# Patient Record
Sex: MALE | Race: WHITE | NOT HISPANIC OR LATINO | Employment: STUDENT | URBAN - METROPOLITAN AREA
[De-identification: names, ages, dates, MRNs, and addresses within clinical notes are randomized per-mention and may not be internally consistent; named-entity substitution may affect disease eponyms.]

---
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
## 2020-03-18 ENCOUNTER — OFFICE VISIT (OUTPATIENT)
Dept: FAMILY MEDICINE CLINIC | Facility: CLINIC | Age: 20
End: 2020-03-18
Payer: COMMERCIAL

## 2020-03-18 VITALS
TEMPERATURE: 98.5 F | BODY MASS INDEX: 29.03 KG/M2 | HEIGHT: 73 IN | WEIGHT: 219 LBS | HEART RATE: 72 BPM | RESPIRATION RATE: 14 BRPM | SYSTOLIC BLOOD PRESSURE: 120 MMHG | DIASTOLIC BLOOD PRESSURE: 80 MMHG

## 2020-03-18 DIAGNOSIS — J01.10 ACUTE NON-RECURRENT FRONTAL SINUSITIS: Primary | ICD-10-CM

## 2020-03-18 PROCEDURE — 1036F TOBACCO NON-USER: CPT | Performed by: FAMILY MEDICINE

## 2020-03-18 PROCEDURE — 99203 OFFICE O/P NEW LOW 30 MIN: CPT | Performed by: FAMILY MEDICINE

## 2020-03-18 PROCEDURE — 3008F BODY MASS INDEX DOCD: CPT | Performed by: FAMILY MEDICINE

## 2020-03-18 RX ORDER — AMOXICILLIN AND CLAVULANATE POTASSIUM 875; 125 MG/1; MG/1
1 TABLET, FILM COATED ORAL EVERY 12 HOURS SCHEDULED
Qty: 14 TABLET | Refills: 0 | Status: SHIPPED | OUTPATIENT
Start: 2020-03-18 | End: 2020-03-25

## 2020-03-18 NOTE — PROGRESS NOTES
Assessment/Plan:    1  Acute non-recurrent frontal sinusitis  -     amoxicillin-clavulanate (Augmentin) 875-125 mg per tablet; Take 1 tablet by mouth every 12 (twelve) hours for 7 days    Supportive care with fluids, rest, and flonase  Patient has tried flonase and will give augmentin to take if worsening symptoms  Patient precautions reviewed including rash, hive, and SOB  Patient should be evaluated  Patient agrees with plan  If symptoms progress, patient should follow up  Return if symptoms worsen or fail to improve, for Annual physical     Subjective:      Patient ID: Jose Flowers is a 23 y o  male  Chief Complaint   Patient presents with    Cold Like Symptoms     pt c/o post nasal drip, congestion and headache x2-3 days       HPI     22 y/o male presents new patient  Patient complains of cold like symptoms for the past 3-4 days  Has stuffy nose and chest congestion  Cough but not all the time  Headaches that are not resolving  Felt warm but no temperature  No chills  Has tried dyllsum, flonase, and nyquil with no relief  No sick contacts  No recent travel  No flu vaccine given  The following portions of the patient's history were reviewed and updated as appropriate: allergies, current medications, past family history, past medical history, past social history, past surgical history and problem list     Review of Systems   Constitutional: Positive for fatigue  Negative for chills and fever  HENT: Positive for congestion, postnasal drip, sinus pressure and sore throat (scratchy)  Respiratory: Positive for cough  Negative for shortness of breath  Cardiovascular: Negative for chest pain and leg swelling  Gastrointestinal: Negative for abdominal pain, constipation, diarrhea, nausea and vomiting  Genitourinary: Negative for dysuria  Neurological: Positive for light-headedness and headaches  Negative for dizziness and weakness  Psychiatric/Behavioral: Negative for agitation  Current Outpatient Medications   Medication Sig Dispense Refill    amoxicillin-clavulanate (Augmentin) 875-125 mg per tablet Take 1 tablet by mouth every 12 (twelve) hours for 7 days 14 tablet 0     No current facility-administered medications for this visit  Objective:    /80 (BP Location: Left arm, Patient Position: Sitting, Cuff Size: Adult)   Pulse 72   Temp 98 5 °F (36 9 °C) (Tympanic)   Resp 14   Ht 6' 1" (1 854 m)   Wt 99 3 kg (219 lb)   BMI 28 89 kg/m²        Physical Exam   Constitutional: He is oriented to person, place, and time  He appears well-developed and well-nourished  HENT:   Head: Normocephalic and atraumatic  Right Ear: External ear normal    Left Ear: External ear normal    Nose: Nose normal    Mouth/Throat: Oropharynx is clear and moist  No oropharyngeal exudate  Eyes: Conjunctivae and EOM are normal  Right eye exhibits no discharge  Left eye exhibits no discharge  Neck: Normal range of motion  Neck supple  Cardiovascular: Normal rate, regular rhythm, normal heart sounds and intact distal pulses  Pulmonary/Chest: Effort normal and breath sounds normal  He has no wheezes  Abdominal: Soft  Bowel sounds are normal  There is no tenderness  Musculoskeletal: He exhibits no edema or tenderness  Lymphadenopathy:     He has no cervical adenopathy  Neurological: He is alert and oriented to person, place, and time  Skin: Skin is warm  No erythema  Psychiatric: He has a normal mood and affect  His behavior is normal    Vitals reviewed               Tone Aldana MD

## 2020-09-18 ENCOUNTER — TELEPHONE (OUTPATIENT)
Dept: OBGYN CLINIC | Facility: HOSPITAL | Age: 20
End: 2020-09-18

## 2020-09-18 NOTE — TELEPHONE ENCOUNTER
Duane Vega [phone]    Mother called stating Dr Eduardo Bradford needed to talk with patient no answer at office, please return call

## 2022-07-21 ENCOUNTER — OFFICE VISIT (OUTPATIENT)
Dept: FAMILY MEDICINE CLINIC | Facility: CLINIC | Age: 22
End: 2022-07-21
Payer: COMMERCIAL

## 2022-07-21 VITALS
SYSTOLIC BLOOD PRESSURE: 110 MMHG | WEIGHT: 218 LBS | BODY MASS INDEX: 27.98 KG/M2 | HEART RATE: 84 BPM | RESPIRATION RATE: 16 BRPM | DIASTOLIC BLOOD PRESSURE: 70 MMHG | TEMPERATURE: 97.9 F | HEIGHT: 74 IN

## 2022-07-21 DIAGNOSIS — F41.9 ANXIETY: ICD-10-CM

## 2022-07-21 DIAGNOSIS — F43.20 ADULT SITUATIONAL STRESS DISORDER: ICD-10-CM

## 2022-07-21 DIAGNOSIS — F41.0 PANIC ATTACKS: Primary | ICD-10-CM

## 2022-07-21 PROCEDURE — 3725F SCREEN DEPRESSION PERFORMED: CPT | Performed by: STUDENT IN AN ORGANIZED HEALTH CARE EDUCATION/TRAINING PROGRAM

## 2022-07-21 PROCEDURE — 99213 OFFICE O/P EST LOW 20 MIN: CPT | Performed by: STUDENT IN AN ORGANIZED HEALTH CARE EDUCATION/TRAINING PROGRAM

## 2022-07-21 RX ORDER — ALPRAZOLAM 0.25 MG/1
0.25 TABLET ORAL
Qty: 10 TABLET | Refills: 0 | Status: SHIPPED | OUTPATIENT
Start: 2022-07-21 | End: 2022-08-25 | Stop reason: SDUPTHER

## 2022-07-21 NOTE — PROGRESS NOTES
Clinic Visit Note  Eric Munguia 25 y o  male   MRN: [id number]    Assessment and Plan      Diagnoses and all orders for this visit:    Panic attacks, Anxiety  Adult situation stress disorder  Patient having true panic attack symptoms affecting quality of life  We discussed using very low-dose alprazolam to help in symptomatic panic attacks is appropriate  Recommend even splitting 0 25 mg tablets  Will have close follow-up in the next 2 weeks to discuss how frequent panic attacks are happening  Hold off on long-term therapy at this time  -     ALPRAZolam (XANAX) 0 25 mg tablet; Take 1 tablet (0 25 mg total) by mouth daily at bedtime as needed for anxiety      My impressions and treatment recommendations were discussed in detail with the patient who verbalized understanding and had no further questions  Discharge instructions were provided  Subjective     Chief Complaint:  Follow-up visit    History of Present Illness:    Patient is a pleasant 20-year-old male coming in for panic attack symptoms  Patient notes that he is getting these more frequently with rapid heart rate, impending doom, cannot get a breath in  This is significantly affecting his school work, potential job opportunities  We discussed the risks versus benefits of medication, patient does not want to be on anything long-term  The following portions of the patient's history were reviewed and updated as appropriate: allergies, current medications, past family history, past medical history, past social history, past surgical history and problem list     REVIEW OF SYSTEMS:  A complete 12-point review of systems is negative other than that noted in the HPI  Review of Systems   Constitutional: Negative for chills and fever  HENT: Negative for ear pain and sore throat  Eyes: Negative for pain and visual disturbance  Respiratory: Negative for cough and shortness of breath      Cardiovascular: Negative for chest pain and palpitations  Gastrointestinal: Negative for abdominal pain and vomiting  Genitourinary: Negative for dysuria and hematuria  Musculoskeletal: Negative for arthralgias and back pain  Skin: Negative for color change and rash  Neurological: Negative for seizures and syncope  Psychiatric/Behavioral: Negative for agitation, behavioral problems and confusion  All other systems reviewed and are negative          Current Outpatient Medications:     ALPRAZolam (XANAX) 0 25 mg tablet, Take 1 tablet (0 25 mg total) by mouth daily at bedtime as needed for anxiety, Disp: 10 tablet, Rfl: 0  Past Medical History:   Diagnosis Date    Anxiety      Past Surgical History:   Procedure Laterality Date    APPENDECTOMY      Dec  2016, surgery done at [address] Right 10/8/2017    Procedure: [address], RLE;  Surgeon: Madelin Hendrix MD;  [facility];  Service: General    FASCIOTOMY Right 10/6/2017    Procedure: Right lower extremity fasciotomy and wound vac application;  Surgeon: Madelin Hendrix MD;  [facility];  Service: General    [address] [place] [address] Road Right 31/4/5665    Procedure: APPLICATION VAC DRESSING, lower extremity;  Surgeon: Madelin Hendrix MD;  [facility];  Service: General    WOUND DEBRIDEMENT Right 10/8/2017    Procedure: DEBRIDEMENT WOUND ([address]), right lower extremity;  Surgeon: Madelin Hendrix MD;  [facility];  Service: General     Social History     Socioeconomic History    Marital status: Single     Spouse name: Not on file    Number of children: Not on file    Years of education: Not on file    Highest education level: Not on file   Occupational History    Not on file   Tobacco Use    Smoking status: Never Smoker    Smokeless tobacco: Never Used   Substance and Sexual Activity    Alcohol use: No    Drug use: Yes     Types: Marijuana    Sexual activity: Not on file   Other Topics Concern    Not on file   Social History Narrative    Not on file     Social Determinants of Health     Financial Resource Strain: Not on file   Food Insecurity: Not on file   Transportation Needs: Not on file   Physical Activity: Not on file   Stress: Not on file   Social Connections: Not on file   Intimate Partner Violence: Not on file   Housing Stability: Not on file     Family History   Problem Relation Age of Onset    No Known Problems Mother     No Known Problems Father      Allergies   Allergen Reactions    Other        Objective     Vitals:    07/21/22 0843   BP: 110/70   BP Location: Left arm   Patient Position: Sitting   Cuff Size: Adult   Pulse: 84   Resp: 16   Temp: 97 9 °F (36 6 °C)   TempSrc: Temporal   Weight: 98 9 kg (218 lb)   Height: 6' 1 5" (1 867 m)       Physical Exam:     GENERAL: NAD, pleasant   HEENT:  NC/AT, PERRL, EOMI, no scleral icterus  CARDIAC:  RRR, +S1/S2, no S3/S4 appreciated, no m/g/r  PULMONARY:  CTA B/L, no wheezing/rales/rhonci, non-labored breathing  ABDOMEN:  Soft, NT/ND, no rebound/guarding/rigidity  Extremities:  No edema, cyanosis, or clubbing  Musculoskeletal:  Full range of motion intact in all extremities   NEUROLOGIC: Grossly intact, no focal deficits  SKIN:  No rashes or erythema noted on exposed skin  Psych: Normal affect, mood stable    ==  PLEASE NOTE:  This encounter was completed utilizing the [employer]/LS9 Direct Speech Voice Recognition Software  Grammatical errors, random word insertions, pronoun errors and incomplete sentences are occasional consequences of the system due to software limitations, ambient noise and hardware issues  These may be missed by proof reading prior to affixing electronic signature  Any questions or concerns about the content, text or information contained within the body of this dictation should be directly addressed to the physician for clarification  Please do not hesitate to call me directly if you have any any questions or concerns      Shira Dial Vollaro, Leoma Posrclas [facility] Internal Medicine   [facility]

## 2022-08-04 ENCOUNTER — TELEMEDICINE (OUTPATIENT)
Dept: FAMILY MEDICINE CLINIC | Facility: CLINIC | Age: 22
End: 2022-08-04
Payer: COMMERCIAL

## 2022-08-04 DIAGNOSIS — F41.9 ANXIETY: Primary | ICD-10-CM

## 2022-08-04 DIAGNOSIS — F41.0 PANIC ATTACKS: ICD-10-CM

## 2022-08-04 PROCEDURE — 99213 OFFICE O/P EST LOW 20 MIN: CPT | Performed by: STUDENT IN AN ORGANIZED HEALTH CARE EDUCATION/TRAINING PROGRAM

## 2022-08-04 RX ORDER — ESCITALOPRAM OXALATE 5 MG/1
5 TABLET ORAL DAILY
Qty: 30 TABLET | Refills: 5 | Status: SHIPPED | OUTPATIENT
Start: 2022-08-04 | End: 2022-08-25

## 2022-08-04 NOTE — PROGRESS NOTES
Virtual Regular Visit    Verification of patient location:    Patient is located in the following state in which I hold an active license [place]      Assessment/Plan:    Problem List Items Addressed This Visit    None     Visit Diagnoses     Anxiety    -  Primary    Relevant Medications    escitalopram (LEXAPRO) 5 mg tablet    Panic attacks            Panic attacks have been better controlled with Xanax q h s  p r n  Has needed use Xanax 4-5 times since last visit  Recommend starting low-dose Lexapro to curb anxiety  Risk vs benefits discussed, patient in agreement with starting medication  Follow-up 2-3 weeks virtual to discuss anti anxiety medications    Reason for visit is   Chief Complaint   Patient presents with    Virtual Regular Visit        Encounter provider Marylou Marcos DO    Provider located at [address] [place] [phone]      Recent Visits  No visits were found meeting these conditions  Showing recent visits within past 7 days and meeting all other requirements  Today's Visits  Date Type Provider Dept   08/04/22 1501 [id number] today's visits and meeting all other requirements  Future Appointments  No visits were found meeting these conditions  Showing future appointments within next 150 days and meeting all other requirements       The patient was identified by name and date of birth  Julia Candi was informed that this is a telemedicine visit and that the visit is being conducted through  [employer] and patient was informed this is a secure, HIPAA-complaint platform  He agrees to proceed     My office door was closed  No one else was in the room  He acknowledged consent and understanding of privacy and security of the video platform  The patient has agreed to participate and understands they can discontinue the visit at any time      Patient is aware this is a billable service  Subjective  Norm Andrew is a 25 y o  male follow-up on anxiety, panic attacks  Patient notes that panic attacks are still frequent but better controlled with as needed Xanax  Patient notes today no palpitations, chest pain, shortness of breath  Discussion on starting low-dose antianxiety medication  Past Medical History:   Diagnosis Date    Anxiety        Past Surgical History:   Procedure Laterality Date    APPENDECTOMY      Dec  2016, surgery done at [address] Right 10/8/2017    Procedure: CLOSURE WOUND, RLE;  Surgeon: Yolanda Serna MD;  [facility];  Service: General    FASCIOTOMY Right 10/6/2017    Procedure: Right lower extremity fasciotomy and wound vac application;  Surgeon: Yolanda Serna MD;  [facility];  Service: Nany Monae DRESSING APPLICATION Right 42/1/3532    Procedure: APPLICATION VAC DRESSING, lower extremity;  Surgeon: Yolanda Serna MD;  [facility];  Service: General    WOUND DEBRIDEMENT Right 10/8/2017    Procedure: DEBRIDEMENT WOUND ([address]), right lower extremity;  Surgeon: Yolanda Serna MD;  [facility];  Service: General       Current Outpatient Medications   Medication Sig Dispense Refill    escitalopram (LEXAPRO) 5 mg tablet Take 1 tablet (5 mg total) by mouth daily 30 tablet 5    ALPRAZolam (XANAX) 0 25 mg tablet Take 1 tablet (0 25 mg total) by mouth daily at bedtime as needed for anxiety 10 tablet 0     No current facility-administered medications for this visit  Allergies   Allergen Reactions    Other        Review of Systems   Constitutional: Negative for chills and fever  HENT: Negative for ear pain and sore throat  Eyes: Negative for pain and visual disturbance  Respiratory: Negative for cough and shortness of breath  Cardiovascular: Negative for chest pain and palpitations     Gastrointestinal: Negative for abdominal pain and vomiting  Genitourinary: Negative for dysuria and hematuria  Musculoskeletal: Negative for arthralgias and back pain  Skin: Negative for color change and rash  Neurological: Negative for seizures and syncope  Psychiatric/Behavioral: Negative for agitation, behavioral problems, confusion, self-injury, sleep disturbance and suicidal ideas  The patient is nervous/anxious  All other systems reviewed and are negative  Video Exam    Physical Exam  Constitutional:       General: He is not in acute distress  Eyes:      General: No scleral icterus  Pulmonary:      Effort: Pulmonary effort is normal  No respiratory distress  Musculoskeletal:         General: No swelling or deformity  Skin:     Coloration: Skin is not jaundiced  Findings: No bruising  Neurological:      General: No focal deficit present  Mental Status: He is alert and oriented to person, place, and time  Mental status is at baseline  Psychiatric:         Mood and Affect: Mood normal          Behavior: Behavior normal           I spent 16 minutes with patient today in which greater than 50% of the time was spent in counseling/coordination of care regarding Anxiety management plan    VIRTUAL VISIT [address] verbally agrees to participate in [employer]  Pt is aware that [employer] could be limited without vital signs or the ability to perform a full hands-on physical exam  Cooper Beatrice Cranker understands he or the provider may request at any time to terminate the video visit and request the patient to seek care or treatment in person

## 2022-08-25 ENCOUNTER — TELEMEDICINE (OUTPATIENT)
Dept: FAMILY MEDICINE CLINIC | Facility: CLINIC | Age: 22
End: 2022-08-25
Payer: COMMERCIAL

## 2022-08-25 DIAGNOSIS — F41.0 PANIC ATTACKS: ICD-10-CM

## 2022-08-25 DIAGNOSIS — F41.9 ANXIETY: Primary | ICD-10-CM

## 2022-08-25 PROCEDURE — 99213 OFFICE O/P EST LOW 20 MIN: CPT | Performed by: STUDENT IN AN ORGANIZED HEALTH CARE EDUCATION/TRAINING PROGRAM

## 2022-08-25 RX ORDER — ALPRAZOLAM 0.25 MG/1
0.25 TABLET ORAL
Qty: 10 TABLET | Refills: 0 | Status: SHIPPED | OUTPATIENT
Start: 2022-08-25 | End: 2022-08-30 | Stop reason: SDUPTHER

## 2022-08-25 NOTE — PROGRESS NOTES
Virtual Regular Visit    Verification of patient location:    Patient is located in the following state in which I hold an active license [place]      Assessment/Plan:    Problem List Items Addressed This Visit    None     Visit Diagnoses     Anxiety    -  Primary    Panic attacks        Relevant Medications    ALPRAZolam (XANAX) 0 25 mg tablet        Refill Xanax, symptoms appear to be doing better as stressors in life are improving  Continue to follow closely, recommend follow-up in 1 month  Please reach out if symptoms worsen  Reason for visit is   Chief Complaint   Patient presents with    Virtual Regular Visit        Encounter provider Aleah Smiley DO    Provider located at [address] [place] [phone]      Recent Visits  No visits were found meeting these conditions  Showing recent visits within past 7 days and meeting all other requirements  Today's Visits  Date Type Provider Dept   08/25/22 [address] today's visits and meeting all other requirements  Future Appointments  No visits were found meeting these conditions  Showing future appointments within next 150 days and meeting all other requirements       The patient was identified by name and date of birth  Ezekiel Morales was informed that this is a telemedicine visit and that the visit is being conducted through [facility] and patient was informed this is a secure, HIPAA-complaint platform  He agrees to proceed     My office door was closed  No one else was in the room  He acknowledged consent and understanding of privacy and security of the video platform  The patient has agreed to participate and understands they can discontinue the visit at any time  Patient is aware this is a billable service  Subjective  Ezekiel Morales is a 25 y o  male follow-up anxiety, panic attacks    We discussed last time starting low-dose Lexapro, after talk with family and looking more into the side effects patient would like to hold off on this which I feel is reasonable  Now that he is finishing college and job search is going better his anxiety and panic attacks are improving  Has not been using Xanax as much  Past Medical History:   Diagnosis Date    Anxiety        Past Surgical History:   Procedure Laterality Date    APPENDECTOMY      Dec  2016, surgery done at [address] Right 10/8/2017    Procedure: [address], RLE;  Surgeon: Samantha Peres MD;  [facility];  Service: General    FASCIOTOMY Right 10/6/2017    Procedure: Right lower extremity fasciotomy and wound vac application;  Surgeon: Samantha Peres MD;  [facility];  Service: Rhina Fought DRESSING APPLICATION Right 02/4/9003    Procedure: APPLICATION VAC DRESSING, lower extremity;  Surgeon: Samantha Peres MD;  [facility];  Service: General    WOUND DEBRIDEMENT Right 10/8/2017    Procedure: DEBRIDEMENT WOUND ([address]), right lower extremity;  Surgeon: Samantha Peres MD;  [facility];  Service: General       Current Outpatient Medications   Medication Sig Dispense Refill    ALPRAZolam (XANAX) 0 25 mg tablet Take 1 tablet (0 25 mg total) by mouth daily at bedtime as needed for anxiety 10 tablet 0     No current facility-administered medications for this visit  Allergies   Allergen Reactions    Other        Review of Systems   Constitutional: Negative for chills and fever  HENT: Negative for ear pain and sore throat  Eyes: Negative for pain and visual disturbance  Respiratory: Negative for cough and shortness of breath  Cardiovascular: Negative for chest pain and palpitations  Gastrointestinal: Negative for abdominal pain and vomiting  Genitourinary: Negative for dysuria and hematuria  Musculoskeletal: Negative for arthralgias and back pain  Skin: Negative for color change and rash  Neurological: Negative for seizures and syncope  All other systems reviewed and are negative  Video Exam    There were no vitals filed for this visit  Physical Exam  Constitutional:       General: He is not in acute distress  Appearance: He is normal weight  HENT:      Head: Normocephalic and atraumatic  Eyes:      General: No scleral icterus  Conjunctiva/sclera: Conjunctivae normal    Pulmonary:      Effort: Pulmonary effort is normal  No respiratory distress  Musculoskeletal:         General: No swelling or deformity  Skin:     Coloration: Skin is not jaundiced  Findings: No bruising  Neurological:      General: No focal deficit present  Mental Status: He is alert  Mental status is at baseline     Psychiatric:         Mood and Affect: Mood normal          Behavior: Behavior normal           I spent 21 minutes with patient today in which greater than 50% of the time was spent in counseling/coordination of care regarding Anxiety, panic attacks

## 2022-08-30 DIAGNOSIS — F41.0 PANIC ATTACKS: ICD-10-CM

## 2022-08-30 RX ORDER — ALPRAZOLAM 0.25 MG/1
0.25 TABLET ORAL
Qty: 10 TABLET | Refills: 0 | Status: SHIPPED | OUTPATIENT
Start: 2022-08-30 | End: 2022-10-06 | Stop reason: SDUPTHER

## 2022-08-30 NOTE — TELEPHONE ENCOUNTER
Patient needs this medication sent to [facility] where he rents an apartment

## 2022-09-17 ENCOUNTER — OFFICE VISIT (OUTPATIENT)
Dept: FAMILY MEDICINE CLINIC | Facility: CLINIC | Age: 22
End: 2022-09-17
Payer: COMMERCIAL

## 2022-09-17 VITALS
DIASTOLIC BLOOD PRESSURE: 78 MMHG | WEIGHT: 219 LBS | BODY MASS INDEX: 28.11 KG/M2 | TEMPERATURE: 98.1 F | RESPIRATION RATE: 14 BRPM | HEIGHT: 74 IN | HEART RATE: 78 BPM | SYSTOLIC BLOOD PRESSURE: 102 MMHG

## 2022-09-17 DIAGNOSIS — Z00.00 ANNUAL PHYSICAL EXAM: ICD-10-CM

## 2022-09-17 DIAGNOSIS — F41.1 GENERALIZED ANXIETY DISORDER WITH PANIC ATTACKS: Primary | ICD-10-CM

## 2022-09-17 DIAGNOSIS — Z13.220 SCREENING CHOLESTEROL LEVEL: ICD-10-CM

## 2022-09-17 DIAGNOSIS — S80.861D INSECT BITE OF RIGHT LOWER LEG, SUBSEQUENT ENCOUNTER: ICD-10-CM

## 2022-09-17 DIAGNOSIS — F41.0 GENERALIZED ANXIETY DISORDER WITH PANIC ATTACKS: Primary | ICD-10-CM
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
## 2022-09-17 DIAGNOSIS — Z13.29 THYROID DISORDER SCREENING: ICD-10-CM

## 2022-09-17 PROCEDURE — 99214 OFFICE O/P EST MOD 30 MIN: CPT | Performed by: STUDENT IN AN ORGANIZED HEALTH CARE EDUCATION/TRAINING PROGRAM

## 2022-09-17 PROCEDURE — 99395 PREV VISIT EST AGE 18-39: CPT | Performed by: STUDENT IN AN ORGANIZED HEALTH CARE EDUCATION/TRAINING PROGRAM

## 2022-09-17 NOTE — PATIENT INSTRUCTIONS

## 2022-09-17 NOTE — PROGRESS NOTES
[address] [facility]    NAME: Hemant Marroquin  AGE: 25 y o  SEX: male  : 2000     DATE: 2022     Assessment and Plan:     Problem List Items Addressed This Visit        Other    Generalized anxiety disorder with panic attacks - Primary    Annual physical exam    Relevant Orders    Comprehensive metabolic panel    CBC and differential      Other Visit Diagnoses     Insect bite of right lower leg, subsequent encounter        Relevant Orders    Lyme Antibody Profile with reflex to WB    Thyroid disorder screening        Relevant Orders    TSH, 3rd generation with Free T4 reflex    Screening cholesterol level        Relevant Orders    Lipid panel        Physical exam unremarkable today on exam   Obtain labs for review over phone, Lyme testing secondary to insect bite  Continue Keflex antibiotic secondary to bite cellulitis  Generalized anxiety with panic attacks, using Xanax appropriately, we did discuss switching up exercise routine to night as most of anxiety attacks are right before bedtime  We will also focus on decreased processed foods  Immunizations and preventive care screenings were discussed with patient today  Appropriate education was printed on patient's after visit summary  Counseling:  Alcohol/drug use: discussed moderation in alcohol intake, the recommendations for healthy alcohol use, and avoidance of illicit drug use  Dental Health: discussed importance of regular tooth brushing, flossing, and dental visits  Injury prevention: discussed safety/seat belts, safety helmets, smoke detectors, carbon dioxide detectors, and smoking near bedding or upholstery  Sexual health: discussed sexually transmitted diseases, partner selection, use of condoms, avoidance of unintended pregnancy, and contraceptive alternatives  Exercise: the importance of regular exercise/physical activity was discussed   Recommend exercise 3-5 times per week for at least 30 minutes  BMI Counseling: Body mass index is 28 5 kg/m²  The BMI is above normal  Nutrition recommendations include decreasing portion sizes, encouraging healthy choices of fruits and vegetables, limiting drinks that contain sugar and moderation in carbohydrate intake  Exercise recommendations include exercising 3-5 times per week  Patient referred to PCP  Rationale for BMI follow-up plan is due to patient being overweight or obese  Return if symptoms worsen or fail to improve  Chief Complaint:     Chief Complaint   Patient presents with    Follow-up     Anxiety     Insect Bite     Spider bite left inner thigh saw [facility]      History of Present Illness:     Adult Annual Physical   Patient here for a comprehensive physical exam  The patient reports no problems  We had a good discussion on anxiety with panic attacks, patient notes unclear trigger, mostly getting these intermittently right before bedtime  He is still sleeping well  Diet and Physical Activity  Diet/Nutrition: well balanced diet  Exercise: 3-4 times a week on average  Depression Screening  PHQ-2/9 Depression Screening         General Health  Sleep: sleeps well  Hearing: normal - bilateral   Vision: no vision problems  Dental: regular dental visits   Health  History of STDs?: no      Review of Systems:     Review of Systems   Constitutional: Negative for chills, fatigue and fever  HENT: Negative for congestion and sore throat  Eyes: Negative for pain and visual disturbance  Respiratory: Negative for shortness of breath and wheezing  Cardiovascular: Negative for chest pain and palpitations  Gastrointestinal: Negative for abdominal pain, constipation, diarrhea, nausea and vomiting  Genitourinary: Negative for dysuria and frequency  Musculoskeletal: Negative for back pain and neck pain  Skin: Negative for color change and rash     Neurological: Negative for dizziness and headaches  Psychiatric/Behavioral: Negative for agitation and confusion  The patient is nervous/anxious  All other systems reviewed and are negative       Past Medical History:     Past Medical History:   Diagnosis Date    Anxiety       Past Surgical History:     Past Surgical History:   Procedure Laterality Date    APPENDECTOMY      Dec  2016, surgery done at [address] [place] [address] St Right 10/8/2017    Procedure: [address], RLE;  Surgeon: Dana Bell MD;  [facility];  Service: General    FASCIOTOMY Right 10/6/2017    Procedure: Right lower extremity fasciotomy and wound vac application;  Surgeon: Dana Bell MD;  [facility];  Service: Charlyne Spruce DRESSING APPLICATION Right 04/0/9921    Procedure: APPLICATION VAC DRESSING, lower extremity;  Surgeon: Dana Bell MD;  [facility];  Service: General    WOUND DEBRIDEMENT Right 10/8/2017    Procedure: DEBRIDEMENT WOUND [facility]), right lower extremity;  Surgeon: Dana Bell MD;  [facility];  Service: General      Social History:     Social History     Socioeconomic History    Marital status: Single     Spouse name: None    Number of children: None    Years of education: None    Highest education level: None   Occupational History    None   Tobacco Use    Smoking status: Never Smoker    Smokeless tobacco: Never Used   Substance and Sexual Activity    Alcohol use: No    Drug use: Yes     Types: Marijuana    Sexual activity: None   Other Topics Concern    None   Social History Narrative    None     Social Determinants of Health     Financial Resource Strain: Not on file   Food Insecurity: Not on file   Transportation Needs: Not on file   Physical Activity: Not on file   Stress: Not on file   Social Connections: Not on file   Intimate Partner Violence: Not on file   Housing Stability: Not on file      Family History:     Family History   Problem Relation Age of Onset    No Known Problems Mother     No Known Problems Father       Current Medications:     Current Outpatient Medications   Medication Sig Dispense Refill    ALPRAZolam (XANAX) 0 25 mg tablet Take 1 tablet (0 25 mg total) by mouth daily at bedtime as needed for anxiety 10 tablet 0     No current facility-administered medications for this visit  Allergies: Allergies   Allergen Reactions    Other       Physical Exam:     /78 (BP Location: Left arm, Patient Position: Sitting, Cuff Size: Adult)   Pulse 78   Temp 98 1 °F (36 7 °C) (Temporal)   Resp 14   Ht 6' 1 5" (1 867 m)   Wt 99 3 kg (219 lb)   BMI 28 50 kg/m²     Physical Exam  Vitals and nursing note reviewed  Constitutional:       Appearance: He is well-developed  HENT:      Head: Normocephalic and atraumatic  Eyes:      Conjunctiva/sclera: Conjunctivae normal    Cardiovascular:      Rate and Rhythm: Normal rate and regular rhythm  Heart sounds: No murmur heard  Pulmonary:      Effort: Pulmonary effort is normal  No respiratory distress  Breath sounds: Normal breath sounds  Abdominal:      Palpations: Abdomen is soft  Tenderness: There is no abdominal tenderness  Musculoskeletal:         General: No swelling  Normal range of motion  Cervical back: Neck supple  Skin:     General: Skin is warm and dry  Capillary Refill: Capillary refill takes less than 2 seconds  Comments: Insect bite inner thigh region left side of leg, improving erythema   Neurological:      General: No focal deficit present  Mental Status: He is alert and oriented to person, place, and time  Mental status is at baseline     Psychiatric:         Mood and Affect: Mood normal          Behavior: Behavior normal           Don Kevin DO   [address]

## 2022-09-28 DIAGNOSIS — F41.0 PANIC ATTACKS: ICD-10-CM

## 2022-09-29 RX ORDER — ALPRAZOLAM 0.25 MG/1
0.25 TABLET ORAL
Qty: 10 TABLET | Refills: 0 | OUTPATIENT
Start: 2022-09-29

## 2022-10-05 DIAGNOSIS — F41.0 PANIC ATTACKS: ICD-10-CM

## 2022-10-05 RX ORDER — ALPRAZOLAM 0.25 MG/1
0.25 TABLET ORAL
Qty: 10 TABLET | Refills: 0 | Status: CANCELLED | OUTPATIENT
Start: 2022-10-05

## 2022-10-06 DIAGNOSIS — F41.0 PANIC ATTACKS: ICD-10-CM

## 2022-10-06 RX ORDER — ALPRAZOLAM 0.25 MG/1
0.25 TABLET ORAL
Qty: 10 TABLET | Refills: 0 | Status: SHIPPED | OUTPATIENT
Start: 2022-10-06 | End: 2022-10-06

## 2022-10-06 RX ORDER — ALPRAZOLAM 0.25 MG/1
0.25 TABLET ORAL
Qty: 10 TABLET | Refills: 0 | Status: SHIPPED | OUTPATIENT
Start: 2022-10-06 | End: 2022-12-19

## 2022-11-15 ENCOUNTER — TELEMEDICINE (OUTPATIENT)
Dept: FAMILY MEDICINE CLINIC | Facility: CLINIC | Age: 22
End: 2022-11-15

## 2022-11-15 DIAGNOSIS — F41.0 GENERALIZED ANXIETY DISORDER WITH PANIC ATTACKS: Primary | ICD-10-CM

## 2022-11-15 DIAGNOSIS — Z13.220 SCREENING CHOLESTEROL LEVEL: ICD-10-CM

## 2022-11-15 DIAGNOSIS — E87.8 ELECTROLYTE ABNORMALITY: ICD-10-CM

## 2022-11-15 DIAGNOSIS — F41.1 GENERALIZED ANXIETY DISORDER WITH PANIC ATTACKS: Primary | ICD-10-CM

## 2022-11-15 DIAGNOSIS — R53.83 OTHER FATIGUE: ICD-10-CM

## 2022-11-15 DIAGNOSIS — Z13.0 SCREENING, IRON DEFICIENCY ANEMIA: ICD-10-CM

## 2022-11-15 PROBLEM — N50.812 TESTICULAR PAIN, LEFT: Status: ACTIVE | Noted: 2022-05-27

## 2022-11-15 NOTE — PROGRESS NOTES
Virtual Regular Visit    Verification of patient location:    Patient is located in the following state in which I hold an active license [place]      Assessment/Plan:    Problem List Items Addressed This Visit        Other    Generalized anxiety disorder with panic attacks - Primary      Other Visit Diagnoses     Other fatigue        Relevant Orders    TSH, 3rd generation with Free T4 reflex    Testosterone, free, total    Screening cholesterol level        Relevant Orders    Lipid panel    Screening, iron deficiency anemia        Relevant Orders    CBC and differential    Electrolyte abnormality        Relevant Orders    Comprehensive metabolic panel        Panic attack disorder stable, Xanax p r n  Fatigue; recommend TSH, testosterone, yearly labs pending    Patient would also like to add MK-677 supplementation to lifting regimen  Currently this medication is under experimental review  Recommend at this time I will discuss with specialty to see if this medication is safe and which labs to follow if able to take this medication  Ongoing discussion with patient, patient agrees for initial labs, will call back with further information on supplementation  Reason for visit is   Chief Complaint   Patient presents with   • Virtual Regular Visit        Encounter provider Harlan Valladares DO    Provider located at [address] [place] [id number]      Recent Visits  No visits were found meeting these conditions  Showing recent visits within past 7 days and meeting all other requirements  Today's Visits  Date Type Provider Dept   11/15/22 Telemedicine [facility] today's visits and meeting all other requirements  Future Appointments  No visits were found meeting these conditions    Showing future appointments within next 150 days and meeting all other requirements       The patient was identified by name and date of birth  Ezekiel Morales was informed that this is a telemedicine visit and that the visit is being conducted through the [employer]  He agrees to proceed     My office door was closed  No one else was in the room  He acknowledged consent and understanding of privacy and security of the video platform  The patient has agreed to participate and understands they can discontinue the visit at any time  Patient is aware this is a billable service  Subjective  Ezekiel Morales is a 25 y o  male coming in to discuss supplemental medication  Past Medical History:   Diagnosis Date   • Anxiety        Past Surgical History:   Procedure Laterality Date   • APPENDECTOMY      Dec  2016, surgery done at [address] [place] [address] Right 10/8/2017    Procedure: CLOSURE WOUND, RLE;  Surgeon: Yolanda Serna MD;  [facility];  Service: General   • FASCIOTOMY Right 10/6/2017    Procedure: Right lower extremity fasciotomy and wound vac application;  Surgeon: Yolanda Serna MD;  [facility];  Service: General   • VAC DRESSING APPLICATION Right 05/8/1500    Procedure: APPLICATION VAC DRESSING, lower extremity;  Surgeon: Yolanda Serna MD;  [facility];  Service: General   • WOUND DEBRIDEMENT Right 10/8/2017    Procedure: DEBRIDEMENT WOUND ([address]), right lower extremity;  Surgeon: Yolanda Serna MD;  [facility];  Service: General       Current Outpatient Medications   Medication Sig Dispense Refill   • ALPRAZolam (XANAX) 0 25 mg tablet Take 1 tablet (0 25 mg total) by mouth daily at bedtime as needed for anxiety 10 tablet 0     No current facility-administered medications for this visit  Allergies   Allergen Reactions   • Other        Review of Systems   Constitutional: Positive for fatigue  Negative for chills and fever  HENT: Negative for congestion and sore throat  Eyes: Negative for pain and visual disturbance  Respiratory: Negative for shortness of breath and wheezing  Cardiovascular: Negative for chest pain and palpitations  Gastrointestinal: Negative for abdominal pain, constipation, diarrhea, nausea and vomiting  Genitourinary: Negative for dysuria and frequency  Musculoskeletal: Negative for back pain and neck pain  Skin: Negative for color change and rash  Neurological: Negative for dizziness and headaches  Psychiatric/Behavioral: Negative for agitation and confusion  All other systems reviewed and are negative  Video Exam    There were no vitals filed for this visit  Physical Exam  Constitutional:       General: He is not in acute distress  HENT:      Head: Normocephalic and atraumatic  Eyes:      General: No scleral icterus  Conjunctiva/sclera: Conjunctivae normal    Pulmonary:      Effort: Pulmonary effort is normal  No respiratory distress  Neurological:      General: No focal deficit present  Mental Status: He is alert  Mental status is at baseline     Psychiatric:         Mood and Affect: Mood normal          Behavior: Behavior normal           I spent 18 minutes with patient today in which greater than 50% of the time was spent in counseling/coordination of care regarding care management

## 2022-12-19 ENCOUNTER — TELEPHONE (OUTPATIENT)
Dept: FAMILY MEDICINE CLINIC | Facility: CLINIC | Age: 22
End: 2022-12-19

## 2022-12-19 DIAGNOSIS — F41.1 GENERALIZED ANXIETY DISORDER WITH PANIC ATTACKS: Primary | ICD-10-CM

## 2022-12-19 DIAGNOSIS — F41.0 GENERALIZED ANXIETY DISORDER WITH PANIC ATTACKS: Primary | ICD-10-CM

## 2022-12-19 RX ORDER — ALPRAZOLAM 0.5 MG/1
0.5 TABLET ORAL
Qty: 12 TABLET | Refills: 0 | Status: SHIPPED | OUTPATIENT
Start: 2022-12-19 | End: 2023-03-17 | Stop reason: SDUPTHER

## 2022-12-19 NOTE — TELEPHONE ENCOUNTER
Pt is requesting an increase to  50mg as he uses this prn but has needed to recently use 2  25mg  Pt aware you are not in the office 12/19 and 12/20

## 2023-03-17 DIAGNOSIS — F41.1 GENERALIZED ANXIETY DISORDER WITH PANIC ATTACKS: ICD-10-CM

## 2023-03-17 DIAGNOSIS — F41.0 GENERALIZED ANXIETY DISORDER WITH PANIC ATTACKS: ICD-10-CM

## 2023-03-17 RX ORDER — ALPRAZOLAM 0.5 MG/1
0.5 TABLET ORAL
Qty: 12 TABLET | Refills: 0 | Status: SHIPPED | OUTPATIENT
Start: 2023-03-17

## 2023-10-28 ENCOUNTER — OFFICE VISIT (OUTPATIENT)
Dept: URGENT CARE | Facility: CLINIC | Age: 23
End: 2023-10-28
Payer: COMMERCIAL

## 2023-10-28 VITALS
SYSTOLIC BLOOD PRESSURE: 110 MMHG | HEIGHT: 73 IN | RESPIRATION RATE: 17 BRPM | DIASTOLIC BLOOD PRESSURE: 78 MMHG | OXYGEN SATURATION: 100 % | TEMPERATURE: 97.7 F | HEART RATE: 88 BPM | BODY MASS INDEX: 26.24 KG/M2 | WEIGHT: 198 LBS

## 2023-10-28 DIAGNOSIS — J02.9 SORE THROAT: ICD-10-CM

## 2023-10-28 DIAGNOSIS — J30.9 ACUTE ALLERGIC RHINITIS: Primary | ICD-10-CM

## 2023-10-28 LAB — S PYO AG THROAT QL: NEGATIVE

## 2023-10-28 PROCEDURE — 99213 OFFICE O/P EST LOW 20 MIN: CPT | Performed by: PHYSICIAN ASSISTANT

## 2023-10-28 PROCEDURE — 87880 STREP A ASSAY W/OPTIC: CPT | Performed by: PHYSICIAN ASSISTANT

## 2023-10-28 PROCEDURE — 87070 CULTURE OTHR SPECIMN AEROBIC: CPT | Performed by: PHYSICIAN ASSISTANT

## 2023-10-28 RX ORDER — PREDNISONE 20 MG/1
TABLET ORAL
Qty: 15 TABLET | Refills: 0 | Status: SHIPPED | OUTPATIENT
Start: 2023-10-28

## 2023-10-28 RX ORDER — FLUTICASONE PROPIONATE 50 MCG
1 SPRAY, SUSPENSION (ML) NASAL DAILY
Qty: 9.9 ML | Refills: 3 | Status: SHIPPED | OUTPATIENT
Start: 2023-10-28

## 2023-10-28 NOTE — PROGRESS NOTES
[facility]        NAME: Elysia Thompson is a 21 y.o. male  : 2000    MRN: [id number]  DATE: 2023  TIME: 10:29 AM    Assessment and Plan   Acute allergic rhinitis [J30.9]  1. Acute allergic rhinitis  predniSONE 20 mg tablet    fluticasone (FLONASE) 50 mcg/act nasal spray      2. Sore throat  POCT rapid strepA    predniSONE 20 mg tablet    Throat culture        Based on the patient's history and exam I suspect allergic rhinitis with a sore throat possibly due to postnasal drip. Due to recalcitrant nasal congestion, we will try oral steroids, over-the-counter antihistamines, and converting his nose spray to Flonase. Patient knows to follow-up in 1 week if his symptoms are persistent and immediately if worse. Patient Instructions     1. Purchase and use over-the-counter cetirizine 10 mg daily throughout the  season. 2.  Oral steroids as prescribed. 3.  Begin Flonase as directed. 4.  Salt water gargles as needed for sore throat pain. 5.  Increase oral fluids. 6.  Follow-up in 1 week either with your primary care or back here if the symptoms have not improved. 7.  Return here immediately if the symptoms worsen. Chief Complaint     Chief Complaint   Patient presents with    Cold Like Symptoms     Nasal congestion for 3 weeks with clogged bilateral ears and sore throat started last  96 days ago). No fever since symptoms started         History of Present Illness       44-year-old male patient with a 3-week history of persistent nasal congestion, sinus pressure. Patient states that he has been using Sinex and Advil Cold and Sinus intermittently with only partial temporary improvement in the symptoms. Patient denies using any antihistamines. Patient is concerned because 6 days ago he also developed a sore throat which has been persistent. Thinks it may be from postnasal discharge but wants to be sure is not strep. Patient denies any significant cough.   No shortness of breath. No fever or chills. Patient states he gets very similar symptoms every year at this time with the temperature changes. Review of Systems   Review of Systems   Constitutional:  Negative for chills, fatigue and fever. HENT:  Positive for congestion, rhinorrhea, sinus pressure and sore throat. Negative for facial swelling and sinus pain. Respiratory:  Negative for cough. Cardiovascular:  Negative for chest pain. Gastrointestinal:  Negative for abdominal pain. Musculoskeletal:  Negative for myalgias. Skin:  Negative for rash. Current Medications       Current Outpatient Medications:     ALPRAZolam (XANAX) 0.5 mg tablet, Take 1 tablet (0.5 mg total) by mouth daily at bedtime as needed for anxiety, Disp: 12 tablet, Rfl: 0    fluticasone (FLONASE) 50 mcg/act nasal spray, 1 spray into each nostril daily, Disp: 9.9 mL, Rfl: 3    predniSONE 20 mg tablet, Take 3 tabs all at once daily for 3 days then 2 tabs for 2 days then 1 tab for 2 days. , Disp: 15 tablet, Rfl: 0    Current Allergies     Allergies as of 10/28/2023 - Reviewed 10/28/2023   Allergen Reaction Noted    Other  10/19/2017            The following portions of the patient's history were reviewed and updated as appropriate: allergies, current medications, past family history, past medical history, past social history, past surgical history and problem list.     Past Medical History:   Diagnosis Date    Anxiety        Past Surgical History:   Procedure Laterality Date    APPENDECTOMY      Dec. 2016, surgery done at [address] [place] [address] Right 10/8/2017    Procedure: [address], RLE;  Surgeon: Abdulkadir Dhaliwal MD;  [facility];  Service: General    FASCIOTOMY Right 10/6/2017    Procedure: Right lower extremity fasciotomy and wound vac application;  Surgeon: Abdulkadir Dhaliwal MD;  [facility];  Service: General    VAC DRESSING APPLICATION Right 20/0/2589    Procedure: APPLICATION VAC DRESSING, lower extremity;  Surgeon: Greg Henderson MD;  [facility];  Service: General    WOUND DEBRIDEMENT Right 10/8/2017    Procedure: DEBRIDEMENT WOUND ([address]), right lower extremity;  Surgeon: Greg Henderson MD;  [facility];  Service: General       Family History   Problem Relation Age of Onset    No Known Problems Mother     No Known Problems Father          Medications have been verified. Objective   /78   Pulse 88   Temp 97.7 °F (36.5 °C)   Resp 17   Ht 6' 1" (1.854 m)   Wt 89.8 kg (198 lb)   SpO2 100%   BMI 26.12 kg/m²        Physical Exam     Physical Exam  Vitals and nursing note reviewed. Constitutional:       General: He is not in acute distress. Appearance: Normal appearance. He is not ill-appearing. HENT:      Head: Normocephalic. Right Ear: Tympanic membrane normal.      Left Ear: Tympanic membrane normal.      Nose: Congestion present. No rhinorrhea. Mouth/Throat:      Mouth: Mucous membranes are moist.      Pharynx: Oropharynx is clear. Posterior oropharyngeal erythema present. No oropharyngeal exudate. Eyes:      Conjunctiva/sclera: Conjunctivae normal.      Pupils: Pupils are equal, round, and reactive to light. Cardiovascular:      Rate and Rhythm: Normal rate and regular rhythm. Pulses: Normal pulses. Pulmonary:      Effort: Pulmonary effort is normal.      Breath sounds: Normal breath sounds. Abdominal:      Tenderness: There is no abdominal tenderness. Musculoskeletal:         General: Normal range of motion. Cervical back: Normal range of motion and neck supple. Lymphadenopathy:      Cervical: Cervical adenopathy present. Skin:     General: Skin is warm and dry. Capillary Refill: Capillary refill takes less than 2 seconds. Neurological:      Mental Status: He is alert and oriented to person, place, and time.    Psychiatric:         Mood and Affect: Mood normal.         Behavior: Behavior normal.

## 2023-10-31 LAB — BACTERIA THROAT CULT: NORMAL

## 2024-01-08 ENCOUNTER — TELEPHONE (OUTPATIENT)
Age: 24
End: 2024-01-08

## 2024-01-08 NOTE — TELEPHONE ENCOUNTER
Checked chart for medication because I thought patient was calling in for a medication refill. No further action needed at this time.

## 2024-01-15 ENCOUNTER — TELEMEDICINE (OUTPATIENT)
Dept: FAMILY MEDICINE CLINIC | Facility: CLINIC | Age: 24
End: 2024-01-15
Payer: COMMERCIAL

## 2024-01-15 ENCOUNTER — TELEPHONE (OUTPATIENT)
Dept: FAMILY MEDICINE CLINIC | Facility: CLINIC | Age: 24
End: 2024-01-15

## 2024-01-15 DIAGNOSIS — Z13.29 THYROID DISORDER SCREENING: ICD-10-CM

## 2024-01-15 DIAGNOSIS — F41.1 GENERALIZED ANXIETY DISORDER WITH PANIC ATTACKS: Primary | ICD-10-CM

## 2024-01-15 DIAGNOSIS — Z13.220 SCREENING CHOLESTEROL LEVEL: ICD-10-CM

## 2024-01-15 DIAGNOSIS — T79.A21D TRAUMATIC COMPARTMENT SYNDROME OF RIGHT LOWER EXTREMITY, SUBSEQUENT ENCOUNTER: ICD-10-CM

## 2024-01-15 DIAGNOSIS — Z12.5 PROSTATE CANCER SCREENING: ICD-10-CM

## 2024-01-15 DIAGNOSIS — Z13.1 DIABETES MELLITUS SCREENING: ICD-10-CM

## 2024-01-15 DIAGNOSIS — R79.89 LOW TESTOSTERONE IN MALE: ICD-10-CM

## 2024-01-15 DIAGNOSIS — F41.0 GENERALIZED ANXIETY DISORDER WITH PANIC ATTACKS: Primary | ICD-10-CM

## 2024-01-15 DIAGNOSIS — E87.8 ELECTROLYTE ABNORMALITY: ICD-10-CM

## 2024-01-15 PROCEDURE — 99442 PR PHYS/QHP TELEPHONE EVALUATION 11-20 MIN: CPT | Performed by: STUDENT IN AN ORGANIZED HEALTH CARE EDUCATION/TRAINING PROGRAM

## 2024-01-15 RX ORDER — ALPRAZOLAM 0.5 MG/1
0.5 TABLET ORAL
Qty: 16 TABLET | Refills: 0 | Status: SHIPPED | OUTPATIENT
Start: 2024-01-15

## 2024-01-15 NOTE — TELEPHONE ENCOUNTER
----- Message from Elvis Mccoy DO sent at 1/15/2024  3:56 PM EST -----  Can we please send patient's blood work/fax to [facility], ZIP Code [phone].    Appreciate the help!

## 2024-01-15 NOTE — PROGRESS NOTES
Virtual Brief Visit    This Visit is being completed by telephone. The Patient is located at Home and in the following state in which I hold an active license [place]    The patient was identified by name and date of birth. Brayden Byrnes was informed that this is a telemedicine visit and that the visit is being conducted through Telephone.  My office door was closed. No one else was in the room.  He acknowledged consent and understanding of privacy and security of the video platform. The patient has agreed to participate and understands they can discontinue the visit at any time.    Patient is aware this is a billable service.       Assessment/Plan:    Problem List Items Addressed This Visit        Musculoskeletal and Integument    Traumatic compartment syndrome of right lower extremity (HCC)       Other    Generalized anxiety disorder with panic attacks - Primary    Relevant Medications    ALPRAZolam (XANAX) 0.5 mg tablet   Other Visit Diagnoses     Low testosterone in male        Relevant Orders    CBC and differential    Testosterone, free, total    Estradiol    Thyroid disorder screening        Relevant Orders    TSH, 3rd generation with Free T4 reflex    Diabetes mellitus screening        Relevant Orders    Hemoglobin A1C    Electrolyte abnormality        Relevant Orders    Comprehensive metabolic panel    Screening cholesterol level        Relevant Orders    Lipid panel    Prostate cancer screening        Relevant Orders    PSA, total and free        Patient is a pleasant 23-year-old male coming in for follow-up on generalized anxiety with panic attack symptoms.  Overall patient has been doing well, has been using lifestyle modifications to decrease anxiety symptoms.  Work related stress positive trigger.  Refill of Xanax to be used as needed, patient has been using appropriately.    Given history of low testosterone, recommend repeat blood work with annual blood work, we will follow-up afterwards.    Recent  Visits  No visits were found meeting these conditions.  Showing recent visits within past 7 days and meeting all other requirements  Today's Visits  Date Type Provider Dept   01/15/24 Telemedicine DO John Couch   Showing today's visits and meeting all other requirements  Future Appointments  No visits were found meeting these conditions.  Showing future appointments within next 150 days and meeting all other requirements         Visit Time  Total Visit Duration: 14 minutes total time

## 2024-01-26 NOTE — TELEPHONE ENCOUNTER
Please redo lab orders on [facility] requisition order so they can pull electronically as their fax machine is not working. Patient is at lab now.

## 2024-01-26 NOTE — TELEPHONE ENCOUNTER
Patient is at [facility] now to get his labs done and their fax machine is broken. Orders needed to be entered electronically for them to receive the orders.

## 2024-01-27 LAB
ALBUMIN SERPL-MCNC: 4.7 G/DL (ref 4.3–5.2)
ALBUMIN/GLOB SERPL: 2.4 {RATIO} (ref 1.2–2.2)
ALP SERPL-CCNC: 81 IU/L (ref 44–121)
ALT SERPL-CCNC: 28 IU/L (ref 0–44)
AST SERPL-CCNC: 23 IU/L (ref 0–40)
BASOPHILS # BLD AUTO: 0.1 X10E3/UL (ref 0–0.2)
BASOPHILS NFR BLD AUTO: 1 %
BILIRUB SERPL-MCNC: 0.3 MG/DL (ref 0–1.2)
BUN SERPL-MCNC: 17 MG/DL (ref 6–20)
BUN/CREAT SERPL: 17 (ref 9–20)
CALCIUM SERPL-MCNC: 9.5 MG/DL (ref 8.7–10.2)
CHLORIDE SERPL-SCNC: 101 MMOL/L (ref 96–106)
CHOLEST SERPL-MCNC: 184 MG/DL (ref 100–199)
CO2 SERPL-SCNC: 22 MMOL/L (ref 20–29)
CREAT SERPL-MCNC: 1.01 MG/DL (ref 0.76–1.27)
EGFR: 107 ML/MIN/1.73
EOSINOPHIL # BLD AUTO: 0.4 X10E3/UL (ref 0–0.4)
EOSINOPHIL NFR BLD AUTO: 5 %
ERYTHROCYTE [DISTWIDTH] IN BLOOD BY AUTOMATED COUNT: 12.8 % (ref 11.6–15.4)
ESTRADIOL SERPL-MCNC: 21.8 PG/ML (ref 7.6–42.6)
GLOBULIN SER-MCNC: 2 G/DL (ref 1.5–4.5)
GLUCOSE SERPL-MCNC: 99 MG/DL (ref 70–99)
HBA1C MFR BLD: 5.3 % (ref 4.8–5.6)
HCT VFR BLD AUTO: 49.2 % (ref 37.5–51)
HDLC SERPL-MCNC: 29 MG/DL
HGB BLD-MCNC: 16.9 G/DL (ref 13–17.7)
IMM GRANULOCYTES # BLD: 0 X10E3/UL (ref 0–0.1)
IMM GRANULOCYTES NFR BLD: 0 %
LDLC SERPL CALC-MCNC: 134 MG/DL (ref 0–99)
LYMPHOCYTES # BLD AUTO: 1.8 X10E3/UL (ref 0.7–3.1)
LYMPHOCYTES NFR BLD AUTO: 26 %
MCH RBC QN AUTO: 30.5 PG (ref 26.6–33)
MCHC RBC AUTO-ENTMCNC: 34.3 G/DL (ref 31.5–35.7)
MCV RBC AUTO: 89 FL (ref 79–97)
MICRODELETION SYND BLD/T FISH: NORMAL
MONOCYTES # BLD AUTO: 0.6 X10E3/UL (ref 0.1–0.9)
MONOCYTES NFR BLD AUTO: 8 %
NEUTROPHILS # BLD AUTO: 4.1 X10E3/UL (ref 1.4–7)
NEUTROPHILS NFR BLD AUTO: 60 %
PLATELET # BLD AUTO: 156 X10E3/UL (ref 150–450)
POTASSIUM SERPL-SCNC: 4.9 MMOL/L (ref 3.5–5.2)
PROT SERPL-MCNC: 6.7 G/DL (ref 6–8.5)
PSA FREE MFR SERPL: 62.9 %
PSA FREE SERPL-MCNC: 0.44 NG/ML
PSA SERPL-MCNC: 0.7 NG/ML (ref 0–4)
RBC # BLD AUTO: 5.55 X10E6/UL (ref 4.14–5.8)
SL AMB VLDL CHOLESTEROL CALC: 21 MG/DL (ref 5–40)
SODIUM SERPL-SCNC: 139 MMOL/L (ref 134–144)
TESTOST FREE SERPL-MCNC: 15.3 PG/ML (ref 9.3–26.5)
TESTOST SERPL-MCNC: 586 NG/DL (ref 264–916)
TRIGL SERPL-MCNC: 112 MG/DL (ref 0–149)
TSH SERPL DL<=0.005 MIU/L-ACNC: 0.91 UIU/ML (ref 0.45–4.5)
WBC # BLD AUTO: 6.9 X10E3/UL (ref 3.4–10.8)

## 2024-02-01 ENCOUNTER — TELEMEDICINE (OUTPATIENT)
Dept: FAMILY MEDICINE CLINIC | Facility: CLINIC | Age: 24
End: 2024-02-01
Payer: COMMERCIAL

## 2024-02-01 DIAGNOSIS — F41.1 GENERALIZED ANXIETY DISORDER WITH PANIC ATTACKS: Primary | ICD-10-CM

## 2024-02-01 DIAGNOSIS — E78.5 BORDERLINE HYPERLIPIDEMIA: ICD-10-CM

## 2024-02-01 DIAGNOSIS — F41.0 GENERALIZED ANXIETY DISORDER WITH PANIC ATTACKS: Primary | ICD-10-CM

## 2024-02-01 PROBLEM — Z00.00 ANNUAL PHYSICAL EXAM: Status: RESOLVED | Noted: 2022-09-17 | Resolved: 2024-02-01

## 2024-02-01 PROBLEM — N50.812 TESTICULAR PAIN, LEFT: Status: RESOLVED | Noted: 2022-05-27 | Resolved: 2024-02-01

## 2024-02-01 PROCEDURE — 99213 OFFICE O/P EST LOW 20 MIN: CPT | Performed by: STUDENT IN AN ORGANIZED HEALTH CARE EDUCATION/TRAINING PROGRAM

## 2024-02-01 NOTE — PROGRESS NOTES
Virtual Regular Visit    Verification of patient location:    Patient is located at Home in the following state in which I hold an active license [place]      Assessment/Plan:    Problem List Items Addressed This Visit        Other    Generalized anxiety disorder with panic attacks - Primary    Borderline hyperlipidemia   Other Visit Diagnoses     BMI 26.0-26.9,adult        Relevant Orders    Ambulatory Referral to Nutrition Services        Patient is a pleasant 23-year-old male coming in for virtual visit follow-up on blood work, all blood work reviewed with patient, patient would like to talk to nutrition services, recommendations given at time of encounter.  Patient notes that generalized anxiety disorder with panic attacks has been well-controlled, has been very infrequently been using Xanax.  Patient does have borderline hyperlipidemia, lifestyle modifications discussed, patient has appropriate diet going forward, plan to recheck blood work 8-10 weeks.      Depression Screening and Follow-up Plan: Patient was screened for depression during today's encounter. They screened negative with a PHQ-2 score of 0.        Reason for visit is   Chief Complaint   Patient presents with   • Virtual Regular Visit          Encounter provider Elvis Mccoy DO    Provider located at [place] [address] [facility] [address]      Recent Visits  Date Type Provider Dept   02/01/24 Telemedicine Elvis Mccoy DO [facility]   Showing recent visits within past 7 days and meeting all other requirements  Future Appointments  No visits were found meeting these conditions.  Showing future appointments within next 150 days and meeting all other requirements       The patient was identified by name and date of birth. Brayden Byrnes was informed that this is a telemedicine visit and that the visit is being conducted through the [employer] Emb[employer]ded platform. He agrees  to proceed..  My office door was closed. No one else was in the room.  He acknowledged consent and understanding of privacy and security of the video platform. The patient has agreed to participate and understands they can discontinue the visit at any time.    Patient is aware this is a billable service.     Deann Byrnes is a 23 y.o. male       Past Medical History:   Diagnosis Date   • Anxiety        Past Surgical History:   Procedure Laterality Date   • APPENDECTOMY      Dec. 2016, surgery done at [facility]   • CLOSURE WOUND Right 10/8/2017    Procedure: CLOSURE WOUND, RLE;  Surgeon: Amish Forte MD;  [facility];  Service: General   • FASCIOTOMY Right 10/6/2017    Procedure: Right lower extremity fasciotomy and wound vac application;  Surgeon: Amish Forte MD;  [facility];  Service: General   • VAC DRESSING APPLICATION Right 10/6/2017    Procedure: APPLICATION VAC DRESSING, lower extremity;  Surgeon: Amish Forte MD;  [facility];  Service: General   • WOUND DEBRIDEMENT Right 10/8/2017    Procedure: DEBRIDEMENT WOUND (WASH OUT), right lower extremity;  Surgeon: Amish Forte MD;  [facility];  Service: General       Current Outpatient Medications   Medication Sig Dispense Refill   • ALPRAZolam (XANAX) 0.5 mg tablet Take 1 tablet (0.5 mg total) by mouth daily at bedtime as needed for anxiety 16 tablet 0     No current facility-administered medications for this visit.        Allergies   Allergen Reactions   • Other        Review of Systems   Constitutional:  Negative for chills, fatigue and fever.   HENT:  Negative for congestion and sore throat.    Eyes:  Negative for pain and visual disturbance.   Respiratory:  Negative for shortness of breath and wheezing.    Cardiovascular:  Negative for chest pain and palpitations.   Gastrointestinal:  Negative for abdominal pain, constipation, diarrhea, nausea and vomiting.    Genitourinary:  Negative for dysuria and frequency.   Musculoskeletal:  Negative for back pain and neck pain.   Skin:  Negative for color change and rash.   Neurological:  Negative for dizziness and headaches.   Psychiatric/Behavioral:  Negative for agitation and confusion.    All other systems reviewed and are negative.      Video Exam      Physical Exam  Constitutional:       General: He is not in acute distress.  HENT:      Head: Normocephalic and atraumatic.   Eyes:      General: No scleral icterus.     Conjunctiva/sclera: Conjunctivae normal.   Pulmonary:      Effort: Pulmonary effort is normal. No respiratory distress.      Breath sounds: Normal breath sounds.   Musculoskeletal:         General: No swelling. Normal range of motion.   Skin:     General: Skin is warm and dry.      Capillary Refill: Capillary refill takes less than 2 seconds.      Coloration: Skin is not jaundiced.   Neurological:      General: No focal deficit present.      Mental Status: He is alert. Mental status is at baseline.   Psychiatric:         Mood and Affect: Mood normal.         Behavior: Behavior normal.          Visit Time  Total Visit Duration: 14 minutes

## 2024-03-06 ENCOUNTER — CLINICAL SUPPORT (OUTPATIENT)
Dept: NUTRITION | Facility: HOSPITAL | Age: 24
End: 2024-03-06
Attending: STUDENT IN AN ORGANIZED HEALTH CARE EDUCATION/TRAINING PROGRAM
Payer: COMMERCIAL

## 2024-03-06 PROCEDURE — 97802 MEDICAL NUTRITION INDIV IN: CPT

## 2024-03-07 VITALS — HEIGHT: 73 IN | BODY MASS INDEX: 25.31 KG/M2 | WEIGHT: 191 LBS

## 2024-03-07 NOTE — PROGRESS NOTES
Virtual Brief Visit    This Visit is being completed by telephone. The Patient is located at Home and in the following state in which I hold an active license [place]    The patient was identified by name and date of birth. Brayden Byrnes was informed that this is a telemedicine visit and that the visit is being conducted through the [employer] Teams platform. He agrees to proceed..  My office door was closed. No one else was in the room.  He acknowledged consent and understanding of privacy and security of the video platform. The patient has agreed to participate and understands they can discontinue the visit at any time.    Patient is aware this is a billable service.       Assessment/Plan:    Problem List Items Addressed This Visit    None  Visit Diagnoses       BMI 26.0-26.9,adult                Recent Visits  No visits were found meeting these conditions.  Showing recent visits within past 7 days and meeting all other requirements  Future Appointments  No visits were found meeting these conditions.  Showing future appointments within next 150 days and meeting all other requirements         Visit Time  Total Visit Duration: 45 minutes          Initial Nutrition Assessment Form    Patient Name: Brayden Byrnes    YOB: 2000    Sex: Male     Assessment Date: 3/6/2024  Start Time: 9 am Stop Time: 9:45 am Total Minutes: 45     Data:  Present at session: self   Parent Concerns: Patient is concerned about his weight, maintaining muscle mass and improving lipid panel.     Medical Dx/Reason for Referral: Z68.26 (ICD-10-CM) - BMI 26.0-26.9,adult    Past Medical History:   Diagnosis Date    Anxiety        Current Outpatient Medications   Medication Sig Dispense Refill    ALPRAZolam (XANAX) 0.5 mg tablet Take 1 tablet (0.5 mg total) by mouth daily at bedtime as needed for anxiety 16 tablet 0     No current facility-administered medications for this visit.        Additional Meds/Supplements: Medications and  "supplements reviewed.     Special Learning Needs: none   Height: HC Readings from Last 3 Encounters:   No data found for HC      Weight: Wt Readings from Last 3 Encounters:   03/06/24 86.6 kg (191 lb)   10/28/23 89.8 kg (198 lb)   09/17/22 99.3 kg (219 lb)     Body mass index is 25.2 kg/m².   Recent Weight Change: [x]Yes     []No  Amount: Patient reports his UBW was around 230#.  He has changed his eating habits and lost weight.  Current weight 191#  Goal weight 185#.      Energy Needs: 2183-2540 calories  (25-30 tiffanie/kg IBW)   Allergies   Allergen Reactions    Other        Social History     Substance and Sexual Activity   Alcohol Use No       Social History     Tobacco Use   Smoking Status Never   Smokeless Tobacco Never       Who shops? patient   Who cooks? patient   Exercise: Patient works out 7 days per week; alternates between cardio and weight lifting   Prior Counseling? []Yes     [x]No  When:      Why:         Diet Hx:  Breakfast: 1 cup fair life milk with powered PB, protein powder and banana; 1 cup egg whites with spinach and 3 slices Ni[place]an hinkle    Lunch: Sweet potato, yogurt, protein (lean meats) water or seltzer; rarely drinks soda and if does, diet only; reports he eats \"super clean\" ; may eat lunch out ~ 3 times per month         Dinner:  Cooked meal that includes starch (white rice) and ground chicken or turkey or fish and vegetables.  He will have red meat occasionally.  He measure all his food and is food logging         Snacks: If hungry, may snack on rice cakes, yogurt, fruit        Nutrition Diagnosis:   Overweight/obesity  related to Excess energy intake as evidenced by  BMI more than normative standard for age and sex (obesity-grade I 30-34.9)       Medical Nutrition Therapy Intervention:  [x]Individualized Meal Plan: reviewed with patient his current diet and provided recommendations for heart healthy diet, lower fat, moderate protein.  Increase fiber.   [x]Understanding Lab Values:  " "reviewed lipid panel and goal levels with patient   []Basic Pathophysiology of Disease []Food/Medication Interactions   [x]Food Diary:  patient is food logging  [x]Exercise:  continue to encourage cardio, strength and stretching exercises    [x]Lifestyle/Behavior Modification Techniques:  patient is measuring all his foods.   []Medication, Mechanism of Action   [x]Label Reading:  reviewed food label for bad fats vs healthy fats []Self Blood Glucose Monitoring   [x]Weight/BMI Goals:  goal weight 185# per patient [x]Other - Patient has made lifestyle changes to improve health, build muscle and lose fat.  He is realistic in his approach and will not restrict a food he really wants (ie red meat).  He is consuming ~15% fat and 30% protein.  Discussed long term excessive intake of protein.  Patient admits prior to February 1, his diet wasn't as healthy as it is currently.     Other Notes:        Comprehension: []Excellent  []Very Good  [x]Good  []Fair   []Poor    Receptivity: []Excellent  []Very Good  [x]Good  []Fair   []Poor    Expected Compliance: []Excellent  []Very Good  [x]Good  []Fair   []Poor        Goals:   Weight loss with goal weight 185#   2.     Limit saturated and trans fat by keeping % daily value less than 5% for most foods   3.     Increase fiber with goal intake 25 g per day       Follow up to be determined.  RD contact information provided.    Labs:  CMP  Lab Results   Component Value Date    K 4.9 01/26/2024     01/26/2024    CO2 22 01/26/2024    BUN 17 01/26/2024    CREATININE 1.01 01/26/2024    GLUCOSE 95 10/06/2017    CALCIUM 8.9 10/07/2017    AST 23 01/26/2024    ALT 28 01/26/2024    ALKPHOS 91 10/07/2017    EGFR 107 01/26/2024       BMP  Lab Results   Component Value Date    GLUCOSE 95 10/06/2017    CALCIUM 8.9 10/07/2017    K 4.9 01/26/2024    CO2 22 01/26/2024     01/26/2024    BUN 17 01/26/2024    CREATININE 1.01 01/26/2024       Lipids  No results found for: \"CHOL\"  Lab Results " "  Component Value Date    HDL 29 (L) 01/26/2024     Lab Results   Component Value Date    LDLCALC 134 (H) 01/26/2024     Lab Results   Component Value Date    TRIG 112 01/26/2024     No results found for: \"CHOLHDL\"    Hemoglobin A1C  Lab Results   Component Value Date    HGBA1C 5.3 01/26/2024       Fasting Glucose  No results found for: \"GLUF\"    Insulin     Thyroid  Lab Results   Component Value Date    TSH 0.907 01/26/2024       Hepatic Function Panel  Lab Results   Component Value Date    ALT 28 01/26/2024    AST 23 01/26/2024    ALKPHOS 91 10/07/2017       Celiac Disease Antibody Panel  No results found for: \"ENDOMYSIAL IGA\", \"GLIADIN IGA\", \"GLIADIN IGG\", \"IGA\", \"TISSUE TRANSGLUT AB\", \"TTG IGA\"   Iron  No results found for: \"IRON\", \"TIBC\", \"FERRITIN\"    Vitamins  No results found for: \"VITAMIN B2\"   No results found for: \"NICOTINAMIDE\", \"NICOTINIC ACID\"   No results found for: \"VITAMINB6\"  No results found for: \"NIYUPXCK29\"  No results found for: \"VITB5\"  No results found for: \"I0IBPUYO\"  No results found for: \"THYROGLB\"  No results found for: \"VITAMIN K\"   No results found for: \"25-HYDROXY VIT D\"   No components found for: \"VITAMINE\"     Amanda Nielsen RD  [facility]  [address] [place] [phone]      "

## 2024-04-22 ENCOUNTER — TELEMEDICINE (OUTPATIENT)
Dept: FAMILY MEDICINE CLINIC | Facility: CLINIC | Age: 24
End: 2024-04-22
Payer: COMMERCIAL

## 2024-04-22 DIAGNOSIS — F41.0 GENERALIZED ANXIETY DISORDER WITH PANIC ATTACKS: ICD-10-CM

## 2024-04-22 DIAGNOSIS — E78.5 BORDERLINE HYPERLIPIDEMIA: ICD-10-CM

## 2024-04-22 DIAGNOSIS — F41.1 GENERALIZED ANXIETY DISORDER WITH PANIC ATTACKS: ICD-10-CM

## 2024-04-22 DIAGNOSIS — J01.00 ACUTE NON-RECURRENT MAXILLARY SINUSITIS: Primary | ICD-10-CM

## 2024-04-22 PROCEDURE — 99213 OFFICE O/P EST LOW 20 MIN: CPT | Performed by: STUDENT IN AN ORGANIZED HEALTH CARE EDUCATION/TRAINING PROGRAM

## 2024-04-22 RX ORDER — METHYLPREDNISOLONE 4 MG/1
TABLET ORAL
Qty: 21 EACH | Refills: 0 | Status: SHIPPED | OUTPATIENT
Start: 2024-04-22

## 2024-04-22 RX ORDER — AMOXICILLIN AND CLAVULANATE POTASSIUM 875; 125 MG/1; MG/1
1 TABLET, FILM COATED ORAL EVERY 12 HOURS SCHEDULED
Qty: 14 TABLET | Refills: 0 | Status: SHIPPED | OUTPATIENT
Start: 2024-04-22 | End: 2024-04-29

## 2024-04-22 NOTE — PROGRESS NOTES
Virtual Regular Visit    Verification of patient location:    Patient is located at Home in the following state in which I hold an active license [place]      Assessment/Plan:    Problem List Items Addressed This Visit        Behavioral Health    Generalized anxiety disorder with panic attacks       Other    Borderline hyperlipidemia   Other Visit Diagnoses     Acute non-recurrent maxillary sinusitis    -  Primary    Relevant Medications    amoxicillin-clavulanate (AUGMENTIN) 875-125 mg per tablet    methylPREDNISolone 4 MG tablet therapy pack        Patient is a pleasant 23-year-old male coming in with classic signs of bacterial maxillary sinusitis after viral infections, recommend antibiotic Augmentin, steroid taper, if symptoms worsen or not improving reevaluation recommended.         Reason for visit is   Chief Complaint   Patient presents with   • Virtual Regular Visit          Encounter provider Elvis Mccoy DO    Provider located at [place] [address] [facility] [id number]      Recent Visits  No visits were found meeting these conditions.  Showing recent visits within past 7 days and meeting all other requirements  Today's Visits  Date Type Provider Dept   04/22/24 Telemedicine Elvis Mccoy DO [facility]   Showing today's visits and meeting all other requirements  Future Appointments  No visits were found meeting these conditions.  Showing future appointments within next 150 days and meeting all other requirements       The patient was identified by name and date of birth. Brayden Byrnes was informed that this is a telemedicine visit and that the visit is being conducted through the Epic Embedded platform. He agrees to proceed..  My office door was closed. No one else was in the room.  He acknowledged consent and understanding of privacy and security of the video platform. The patient has agreed to participate and understands  they can discontinue the visit at any time.    Patient is aware this is a billable service.     Subjective  Brayden Byrnes is a 23 y.o. male       Acute Care Visit         Past Medical History:   Diagnosis Date   • Anxiety        Past Surgical History:   Procedure Laterality Date   • APPENDECTOMY      Dec. 2016, surgery done at [facility]   • CLOSURE WOUND Right 10/8/2017    Procedure: CLOSURE WOUND, RLE;  Surgeon: Amish Forte MD;  Location: [facility];  Service: General   • FASCIOTOMY Right 10/6/2017    Procedure: Right lower extremity fasciotomy and wound vac application;  Surgeon: Amish Forte MD;  Location: [facility];  Service: General   • VAC DRESSING APPLICATION Right 10/6/2017    Procedure: APPLICATION VAC DRESSING, lower extremity;  Surgeon: Amish Forte MD;  Location: [facility];  Service: General   • WOUND DEBRIDEMENT Right 10/8/2017    Procedure: DEBRIDEMENT WOUND (WASH OUT), right lower extremity;  Surgeon: Amish Forte MD;  Location: [facility];  Service: General       Current Outpatient Medications   Medication Sig Dispense Refill   • amoxicillin-clavulanate (AUGMENTIN) 875-125 mg per tablet Take 1 tablet by mouth every 12 (twelve) hours for 7 days 14 tablet 0   • methylPREDNISolone 4 MG tablet therapy pack Use as directed on package 21 each 0   • ALPRAZolam (XANAX) 0.5 mg tablet Take 1 tablet (0.5 mg total) by mouth daily at bedtime as needed for anxiety 16 tablet 0     No current facility-administered medications for this visit.        Allergies   Allergen Reactions   • Other        Review of Systems   Constitutional:  Positive for fatigue. Negative for chills and fever.   HENT:  Positive for congestion, postnasal drip and sore throat.    Respiratory:  Negative for cough, shortness of breath and wheezing.    Cardiovascular:  Negative for chest pain, palpitations and leg swelling.   Neurological:  Negative for dizziness and headaches.       Video  Exam    There were no vitals filed for this visit.    Physical Exam  Constitutional:       General: He is not in acute distress.  HENT:      Mouth/Throat:      Pharynx: Oropharyngeal exudate and posterior oropharyngeal erythema present.   Eyes:      General: No scleral icterus.     Conjunctiva/sclera: Conjunctivae normal.   Pulmonary:      Effort: Pulmonary effort is normal. No respiratory distress.   Skin:     Coloration: Skin is not jaundiced.      Findings: No bruising.   Neurological:      General: No focal deficit present.      Mental Status: He is alert. Mental status is at baseline.   Psychiatric:         Mood and Affect: Mood normal.         Behavior: Behavior normal.          Visit Time  Total Visit Duration: 12 minutes total time

## (undated) DEVICE — TUBING SUCTION 5MM X 12 FT

## (undated) DEVICE — BULB SYRINGE,IRRIGATION WITH PROTECTIVE CAP: Brand: DOVER

## (undated) DEVICE — PLUMEPEN PRO 10FT

## (undated) DEVICE — TIBURON SPLIT SHEET: Brand: CONVERTORS

## (undated) DEVICE — SUT ETHILON 3-0 FS-1 18 IN 663G

## (undated) DEVICE — MEDI-VAC YANK SUCT HNDL W/TPRD BULBOUS TIP: Brand: CARDINAL HEALTH

## (undated) DEVICE — ACE WRAP 6 IN UNSTERILE

## (undated) DEVICE — STRL UNIVERSAL OUTPATIENT PACK: Brand: CARDINAL HEALTH

## (undated) DEVICE — PACK MAJOR ORTHO W/SPLITS PBDS

## (undated) DEVICE — INTENDED FOR TISSUE SEPARATION, AND OTHER PROCEDURES THAT REQUIRE A SHARP SURGICAL BLADE TO PUNCTURE OR CUT.: Brand: BARD-PARKER SAFETY BLADES SIZE 15, STERILE

## (undated) DEVICE — OCCLUSIVE GAUZE STRIP,3% BISMUTH TRIBROMOPHENATE IN PETROLATUM BLEND: Brand: XEROFORM

## (undated) DEVICE — SUT VICRYL 3-0 SH 27 IN J416H

## (undated) DEVICE — 3000CC GUARDIAN II: Brand: GUARDIAN

## (undated) DEVICE — SPONGE STICK WITH PVP-I: Brand: KENDALL

## (undated) DEVICE — SPONGE LAP 18 X 18 IN

## (undated) DEVICE — GLOVE SRG BIOGEL ORTHOPEDIC 7.5

## (undated) DEVICE — REM POLYHESIVE ADULT PATIENT RETURN ELECTRODE: Brand: VALLEYLAB

## (undated) DEVICE — KERLIX BANDAGE ROLL: Brand: KERLIX

## (undated) DEVICE — CHLORAPREP HI-LITE 26ML ORANGE